# Patient Record
Sex: FEMALE
[De-identification: names, ages, dates, MRNs, and addresses within clinical notes are randomized per-mention and may not be internally consistent; named-entity substitution may affect disease eponyms.]

---

## 2020-08-25 ENCOUNTER — APPOINTMENT (OUTPATIENT)
Dept: DERMATOLOGY | Facility: CLINIC | Age: 60
End: 2020-08-25
Payer: COMMERCIAL

## 2020-08-25 VITALS — BODY MASS INDEX: 36.96 KG/M2 | WEIGHT: 230 LBS | HEIGHT: 66 IN

## 2020-08-25 DIAGNOSIS — J45.20 MILD INTERMITTENT ASTHMA, UNCOMPLICATED: ICD-10-CM

## 2020-08-25 DIAGNOSIS — D23.9 OTHER BENIGN NEOPLASM OF SKIN, UNSPECIFIED: ICD-10-CM

## 2020-08-25 DIAGNOSIS — Z87.891 PERSONAL HISTORY OF NICOTINE DEPENDENCE: ICD-10-CM

## 2020-08-25 DIAGNOSIS — Z12.83 ENCOUNTER FOR SCREENING FOR MALIGNANT NEOPLASM OF SKIN: ICD-10-CM

## 2020-08-25 DIAGNOSIS — L72.0 EPIDERMAL CYST: ICD-10-CM

## 2020-08-25 DIAGNOSIS — L82.1 OTHER SEBORRHEIC KERATOSIS: ICD-10-CM

## 2020-08-25 DIAGNOSIS — L70.8 OTHER ACNE: ICD-10-CM

## 2020-08-25 PROBLEM — Z00.00 ENCOUNTER FOR PREVENTIVE HEALTH EXAMINATION: Status: ACTIVE | Noted: 2020-08-25

## 2020-08-25 PROCEDURE — 99203 OFFICE O/P NEW LOW 30 MIN: CPT

## 2020-09-18 ENCOUNTER — TRANSCRIPTION ENCOUNTER (OUTPATIENT)
Age: 60
End: 2020-09-18

## 2020-10-22 ENCOUNTER — TRANSCRIPTION ENCOUNTER (OUTPATIENT)
Age: 60
End: 2020-10-22

## 2025-05-09 ENCOUNTER — NON-APPOINTMENT (OUTPATIENT)
Age: 65
End: 2025-05-09

## 2025-05-14 ENCOUNTER — NON-APPOINTMENT (OUTPATIENT)
Age: 65
End: 2025-05-14

## 2025-05-14 ENCOUNTER — APPOINTMENT (OUTPATIENT)
Dept: HEART AND VASCULAR | Facility: CLINIC | Age: 65
End: 2025-05-14
Payer: MEDICARE

## 2025-05-14 VITALS
OXYGEN SATURATION: 96 % | SYSTOLIC BLOOD PRESSURE: 120 MMHG | WEIGHT: 200 LBS | BODY MASS INDEX: 32.14 KG/M2 | DIASTOLIC BLOOD PRESSURE: 73 MMHG | HEART RATE: 67 BPM | TEMPERATURE: 98.1 F | HEIGHT: 66 IN

## 2025-05-14 DIAGNOSIS — I48.0 PAROXYSMAL ATRIAL FIBRILLATION: ICD-10-CM

## 2025-05-14 PROCEDURE — 99204 OFFICE O/P NEW MOD 45 MIN: CPT

## 2025-05-14 PROCEDURE — 93000 ELECTROCARDIOGRAM COMPLETE: CPT

## 2025-05-14 RX ORDER — METOPROLOL SUCCINATE 25 MG/1
25 TABLET, EXTENDED RELEASE ORAL
Refills: 0 | Status: ACTIVE | COMMUNITY
Start: 2025-05-14

## 2025-05-14 RX ORDER — APIXABAN 5 MG/1
5 TABLET, FILM COATED ORAL
Refills: 0 | Status: ACTIVE | COMMUNITY
Start: 2025-05-14

## 2025-05-14 RX ORDER — FLUTICASONE PROPIONATE AND SALMETEROL 250; 50 UG/1; UG/1
250-50 POWDER RESPIRATORY (INHALATION)
Refills: 0 | Status: ACTIVE | COMMUNITY

## 2025-06-18 ENCOUNTER — NON-APPOINTMENT (OUTPATIENT)
Age: 65
End: 2025-06-18

## 2025-06-18 ENCOUNTER — APPOINTMENT (OUTPATIENT)
Dept: HEART AND VASCULAR | Facility: CLINIC | Age: 65
End: 2025-06-18
Payer: MEDICARE

## 2025-06-18 PROCEDURE — 93298 REM INTERROG DEV EVAL SCRMS: CPT

## 2025-06-26 ENCOUNTER — OUTPATIENT (OUTPATIENT)
Dept: OUTPATIENT SERVICES | Facility: HOSPITAL | Age: 65
LOS: 1 days | Discharge: HOME | End: 2025-06-26
Payer: MEDICARE

## 2025-06-26 VITALS — HEIGHT: 66 IN | WEIGHT: 197.09 LBS

## 2025-06-26 LAB
ANION GAP SERPL CALC-SCNC: 7 MMOL/L — SIGNIFICANT CHANGE UP (ref 5–17)
APTT BLD: 41.9 SEC — HIGH (ref 26.1–36.8)
BLD GP AB SCN SERPL QL: NEGATIVE — SIGNIFICANT CHANGE UP
BUN SERPL-MCNC: 14 MG/DL — SIGNIFICANT CHANGE UP (ref 7–23)
CALCIUM SERPL-MCNC: 9.3 MG/DL — SIGNIFICANT CHANGE UP (ref 8.4–10.5)
CHLORIDE SERPL-SCNC: 106 MMOL/L — SIGNIFICANT CHANGE UP (ref 96–108)
CO2 SERPL-SCNC: 27 MMOL/L — SIGNIFICANT CHANGE UP (ref 22–31)
CREAT SERPL-MCNC: 0.83 MG/DL — SIGNIFICANT CHANGE UP (ref 0.5–1.3)
EGFR: 78 ML/MIN/1.73M2 — SIGNIFICANT CHANGE UP
EGFR: 78 ML/MIN/1.73M2 — SIGNIFICANT CHANGE UP
GLUCOSE SERPL-MCNC: 88 MG/DL — SIGNIFICANT CHANGE UP (ref 70–99)
HCT VFR BLD CALC: 46.6 % — HIGH (ref 34.5–45)
HGB BLD-MCNC: 15.1 G/DL — SIGNIFICANT CHANGE UP (ref 11.5–15.5)
INR BLD: 1.26 — HIGH (ref 0.85–1.16)
MCHC RBC-ENTMCNC: 29.5 PG — SIGNIFICANT CHANGE UP (ref 27–34)
MCHC RBC-ENTMCNC: 32.4 G/DL — SIGNIFICANT CHANGE UP (ref 32–36)
MCV RBC AUTO: 91 FL — SIGNIFICANT CHANGE UP (ref 80–100)
NRBC # BLD AUTO: 0 K/UL — SIGNIFICANT CHANGE UP (ref 0–0)
NRBC # FLD: 0 K/UL — SIGNIFICANT CHANGE UP (ref 0–0)
NRBC BLD AUTO-RTO: 0 /100 WBCS — SIGNIFICANT CHANGE UP (ref 0–0)
PLATELET # BLD AUTO: 253 K/UL — SIGNIFICANT CHANGE UP (ref 150–400)
PMV BLD: 10.4 FL — SIGNIFICANT CHANGE UP (ref 7–13)
POTASSIUM SERPL-MCNC: 4.2 MMOL/L — SIGNIFICANT CHANGE UP (ref 3.5–5.3)
POTASSIUM SERPL-SCNC: 4.2 MMOL/L — SIGNIFICANT CHANGE UP (ref 3.5–5.3)
PROTHROM AB SERPL-ACNC: 14.5 SEC — HIGH (ref 9.9–13.4)
RBC # BLD: 5.12 M/UL — SIGNIFICANT CHANGE UP (ref 3.8–5.2)
RBC # FLD: 13.2 % — SIGNIFICANT CHANGE UP (ref 10.3–14.5)
RH IG SCN BLD-IMP: POSITIVE — SIGNIFICANT CHANGE UP
RH IG SCN BLD-IMP: POSITIVE — SIGNIFICANT CHANGE UP
SODIUM SERPL-SCNC: 140 MMOL/L — SIGNIFICANT CHANGE UP (ref 135–145)
WBC # BLD: 9.05 K/UL — SIGNIFICANT CHANGE UP (ref 3.8–10.5)
WBC # FLD AUTO: 9.05 K/UL — SIGNIFICANT CHANGE UP (ref 3.8–10.5)

## 2025-06-26 PROCEDURE — 93656 COMPRE EP EVAL ABLTJ ATR FIB: CPT

## 2025-06-26 PROCEDURE — C1759: CPT

## 2025-06-26 PROCEDURE — C1893: CPT

## 2025-06-26 PROCEDURE — C1733: CPT

## 2025-06-26 PROCEDURE — C1894: CPT

## 2025-06-26 PROCEDURE — 86900 BLOOD TYPING SEROLOGIC ABO: CPT

## 2025-06-26 PROCEDURE — 80048 BASIC METABOLIC PNL TOTAL CA: CPT

## 2025-06-26 PROCEDURE — 86850 RBC ANTIBODY SCREEN: CPT

## 2025-06-26 PROCEDURE — 85027 COMPLETE CBC AUTOMATED: CPT

## 2025-06-26 PROCEDURE — 93005 ELECTROCARDIOGRAM TRACING: CPT

## 2025-06-26 PROCEDURE — 36415 COLL VENOUS BLD VENIPUNCTURE: CPT

## 2025-06-26 PROCEDURE — 93010 ELECTROCARDIOGRAM REPORT: CPT

## 2025-06-26 PROCEDURE — 85730 THROMBOPLASTIN TIME PARTIAL: CPT

## 2025-06-26 PROCEDURE — C1766: CPT

## 2025-06-26 PROCEDURE — 86901 BLOOD TYPING SEROLOGIC RH(D): CPT

## 2025-06-26 PROCEDURE — 99221 1ST HOSP IP/OBS SF/LOW 40: CPT | Mod: 25

## 2025-06-26 PROCEDURE — C1731: CPT

## 2025-06-26 PROCEDURE — C1760: CPT

## 2025-06-26 PROCEDURE — 85347 COAGULATION TIME ACTIVATED: CPT

## 2025-06-26 PROCEDURE — C9399: CPT

## 2025-06-26 PROCEDURE — 85610 PROTHROMBIN TIME: CPT

## 2025-06-26 RX ORDER — METOPROLOL SUCCINATE 50 MG/1
1 TABLET, EXTENDED RELEASE ORAL
Refills: 0 | DISCHARGE

## 2025-06-26 RX ORDER — APIXABAN 2.5 MG/1
1 TABLET, FILM COATED ORAL
Refills: 0 | DISCHARGE

## 2025-06-26 NOTE — H&P ADULT - HISTORY OF PRESENT ILLNESS
65 year old female with asthma paroxysmal atrial fibrillation s/p ILR, who presents for scheduled ablation.   ?  She has episodes of palpitations and was diagnosed with atrial fibrillation. She was diagnosed 8/2024. At first it was infrequent but now she is getting episodes a few times a month. No clear trigger. She experiences palpitations and then takes an extra metoprolol with resolution. On Eliquis without interruptions,   last dose 6/26/25.   No syncope, chest pain, edema, orthopnea.  She has had a sleep study and did not have any significant sleep apnea  She is actively trying to loose weight

## 2025-06-26 NOTE — H&P ADULT - NS ATTEND AMEND GEN_ALL_CORE FT
65 year old female with asthma paroxysmal atrial fibrillation s/p ILR, who presents for ablation of atrial fibrillation. She has had an increasing frequency of symptomatic episodes and we plan on performing a PFA pulmonary vein isolation.    We discussed her comorbidities including her obesity and the need for weight loss both to reduce the incidence of atrial fibrillation as well as all of the other comorbidities that it confers. She understands and is actively attempting weight loss. we discussed the role of resistance exercise in this endeavor.  We also discussed the role of long-term use of anticoagulation and she understands the need for continuation in spite of the ablation.  I spent 40 minutes in face to face contact time with the patient discussing these issues and the treatment and management plan.

## 2025-06-26 NOTE — PROGRESS NOTE ADULT - ASSESSMENT
66 yo female with a Hx of asthma, pAF s/p ILR now s/p PVI via PFA ([facility]).    -Post procedural ECG  -Maintain active bedrest for 3 hours  -Resume home medications  -Discharge today

## 2025-06-26 NOTE — H&P ADULT - ASSESSMENT
65 year old female with asthma paroxysmal atrial fibrillation s/p ILR, who presents for scheduled ablation.

## 2025-06-27 PROBLEM — J45.909 UNSPECIFIED ASTHMA, UNCOMPLICATED: Chronic | Status: ACTIVE | Noted: 2025-06-26

## 2025-07-16 ENCOUNTER — APPOINTMENT (OUTPATIENT)
Dept: HEART AND VASCULAR | Facility: CLINIC | Age: 65
End: 2025-07-16

## 2025-07-16 VITALS
HEIGHT: 66 IN | WEIGHT: 199 LBS | SYSTOLIC BLOOD PRESSURE: 112 MMHG | TEMPERATURE: 97.1 F | DIASTOLIC BLOOD PRESSURE: 77 MMHG | BODY MASS INDEX: 31.98 KG/M2 | HEART RATE: 66 BPM

## 2025-07-16 PROCEDURE — 99213 OFFICE O/P EST LOW 20 MIN: CPT

## 2025-08-19 ENCOUNTER — APPOINTMENT (OUTPATIENT)
Dept: HEART AND VASCULAR | Facility: CLINIC | Age: 65
End: 2025-08-19
Payer: MEDICARE

## 2025-08-19 ENCOUNTER — NON-APPOINTMENT (OUTPATIENT)
Age: 65
End: 2025-08-19

## 2025-08-19 PROCEDURE — 93298 REM INTERROG DEV EVAL SCRMS: CPT

## 2025-09-17 ENCOUNTER — APPOINTMENT (OUTPATIENT)
Dept: HEART AND VASCULAR | Facility: CLINIC | Age: 65
End: 2025-09-17
Payer: MEDICARE

## 2025-09-17 VITALS
DIASTOLIC BLOOD PRESSURE: 57 MMHG | HEART RATE: 68 BPM | WEIGHT: 198 LBS | SYSTOLIC BLOOD PRESSURE: 114 MMHG | HEIGHT: 66 IN | BODY MASS INDEX: 31.82 KG/M2 | TEMPERATURE: 98.1 F | OXYGEN SATURATION: 97 %

## 2025-09-17 DIAGNOSIS — I48.0 PAROXYSMAL ATRIAL FIBRILLATION: ICD-10-CM

## 2025-09-17 PROCEDURE — 99213 OFFICE O/P EST LOW 20 MIN: CPT
